# Patient Record
Sex: MALE | Race: BLACK OR AFRICAN AMERICAN | NOT HISPANIC OR LATINO | Employment: FULL TIME | URBAN - METROPOLITAN AREA
[De-identification: names, ages, dates, MRNs, and addresses within clinical notes are randomized per-mention and may not be internally consistent; named-entity substitution may affect disease eponyms.]

---

## 2021-05-07 ENCOUNTER — CLINICAL SUPPORT (OUTPATIENT)
Dept: URGENT CARE | Facility: CLINIC | Age: 36
End: 2021-05-07
Payer: COMMERCIAL

## 2021-05-07 DIAGNOSIS — Z11.52 ENCOUNTER FOR SCREENING LABORATORY TESTING FOR COVID-19 VIRUS: Primary | ICD-10-CM

## 2021-05-07 LAB
CTP QC/QA: YES
SARS-COV-2 RDRP RESP QL NAA+PROBE: NEGATIVE

## 2021-05-07 PROCEDURE — U0002: ICD-10-PCS | Mod: QW,S$GLB,, | Performed by: PHYSICIAN ASSISTANT

## 2021-05-07 PROCEDURE — U0002 COVID-19 LAB TEST NON-CDC: HCPCS | Mod: QW,S$GLB,, | Performed by: PHYSICIAN ASSISTANT

## 2021-08-12 ENCOUNTER — TELEPHONE (OUTPATIENT)
Dept: URGENT CARE | Facility: CLINIC | Age: 36
End: 2021-08-12

## 2021-08-12 ENCOUNTER — CLINICAL SUPPORT (OUTPATIENT)
Dept: URGENT CARE | Facility: CLINIC | Age: 36
End: 2021-08-12
Payer: COMMERCIAL

## 2021-08-12 VITALS — TEMPERATURE: 99 F

## 2021-08-12 DIAGNOSIS — Z11.52 ENCOUNTER FOR SCREENING LABORATORY TESTING FOR COVID-19 VIRUS: Primary | ICD-10-CM

## 2021-08-12 DIAGNOSIS — U07.1 COVID-19: Primary | ICD-10-CM

## 2021-08-12 LAB
CTP QC/QA: YES
SARS-COV-2 RDRP RESP QL NAA+PROBE: POSITIVE

## 2021-08-12 PROCEDURE — U0002 COVID-19 LAB TEST NON-CDC: HCPCS | Mod: QW,S$GLB,, | Performed by: PHYSICIAN ASSISTANT

## 2021-08-12 PROCEDURE — U0002: ICD-10-PCS | Mod: QW,S$GLB,, | Performed by: PHYSICIAN ASSISTANT

## 2021-08-13 ENCOUNTER — INFUSION (OUTPATIENT)
Dept: INFECTIOUS DISEASES | Facility: HOSPITAL | Age: 36
End: 2021-08-13
Attending: INTERNAL MEDICINE
Payer: COMMERCIAL

## 2021-08-13 VITALS
HEIGHT: 75 IN | BODY MASS INDEX: 28.6 KG/M2 | OXYGEN SATURATION: 96 % | DIASTOLIC BLOOD PRESSURE: 70 MMHG | TEMPERATURE: 100 F | RESPIRATION RATE: 16 BRPM | HEART RATE: 102 BPM | SYSTOLIC BLOOD PRESSURE: 109 MMHG | WEIGHT: 230 LBS

## 2021-08-13 DIAGNOSIS — U07.1 COVID-19: Primary | ICD-10-CM

## 2021-08-13 PROCEDURE — M0243 CASIRIVI AND IMDEVI INFUSION: HCPCS | Performed by: INTERNAL MEDICINE

## 2021-08-13 PROCEDURE — 63600175 PHARM REV CODE 636 W HCPCS: Performed by: INTERNAL MEDICINE

## 2021-08-13 PROCEDURE — 25000003 PHARM REV CODE 250: Performed by: INTERNAL MEDICINE

## 2021-08-13 RX ORDER — DIPHENHYDRAMINE HYDROCHLORIDE 50 MG/ML
25 INJECTION INTRAMUSCULAR; INTRAVENOUS ONCE AS NEEDED
Status: ACTIVE | OUTPATIENT
Start: 2021-08-13 | End: 2033-01-09

## 2021-08-13 RX ORDER — ACETAMINOPHEN 325 MG/1
650 TABLET ORAL ONCE AS NEEDED
Status: ACTIVE | OUTPATIENT
Start: 2021-08-13 | End: 2033-01-09

## 2021-08-13 RX ORDER — EPINEPHRINE 0.3 MG/.3ML
0.3 INJECTION SUBCUTANEOUS
Status: ACTIVE | OUTPATIENT
Start: 2021-08-13

## 2021-08-13 RX ORDER — ONDANSETRON 4 MG/1
4 TABLET, ORALLY DISINTEGRATING ORAL ONCE AS NEEDED
Status: ACTIVE | OUTPATIENT
Start: 2021-08-13 | End: 2033-01-09

## 2021-08-13 RX ORDER — ALBUTEROL SULFATE 90 UG/1
2 AEROSOL, METERED RESPIRATORY (INHALATION)
Status: ACTIVE | OUTPATIENT
Start: 2021-08-13

## 2021-08-13 RX ORDER — SODIUM CHLORIDE 0.9 % (FLUSH) 0.9 %
10 SYRINGE (ML) INJECTION
Status: ACTIVE | OUTPATIENT
Start: 2021-08-13

## 2021-08-13 RX ADMIN — CASIRIVIMAB AND IMDEVIMAB 600 MG: 600; 600 INJECTION, SOLUTION, CONCENTRATE INTRAVENOUS at 01:08

## 2025-02-24 ENCOUNTER — HOSPITAL ENCOUNTER (EMERGENCY)
Facility: OTHER | Age: 40
Discharge: HOME | End: 2025-02-24
Attending: EMERGENCY MEDICINE
Payer: COMMERCIAL

## 2025-02-24 VITALS
RESPIRATION RATE: 18 BRPM | SYSTOLIC BLOOD PRESSURE: 141 MMHG | TEMPERATURE: 98 F | BODY MASS INDEX: 28.72 KG/M2 | OXYGEN SATURATION: 98 % | WEIGHT: 231 LBS | HEART RATE: 75 BPM | HEIGHT: 75 IN | DIASTOLIC BLOOD PRESSURE: 95 MMHG

## 2025-02-24 DIAGNOSIS — M25.562 CHRONIC PAIN OF BOTH KNEES: ICD-10-CM

## 2025-02-24 DIAGNOSIS — M25.561 CHRONIC PAIN OF BOTH KNEES: ICD-10-CM

## 2025-02-24 DIAGNOSIS — M54.9 ACUTE LEFT-SIDED BACK PAIN, UNSPECIFIED BACK LOCATION: Primary | ICD-10-CM

## 2025-02-24 DIAGNOSIS — G89.29 CHRONIC PAIN OF BOTH KNEES: ICD-10-CM

## 2025-02-24 PROCEDURE — 99284 EMERGENCY DEPT VISIT MOD MDM: CPT | Mod: 25

## 2025-02-24 PROCEDURE — 63600175 PHARM REV CODE 636 W HCPCS: Mod: JZ,TB

## 2025-02-24 PROCEDURE — 96372 THER/PROPH/DIAG INJ SC/IM: CPT

## 2025-02-24 RX ORDER — NAPROXEN 500 MG/1
500 TABLET ORAL 2 TIMES DAILY WITH MEALS
Qty: 14 TABLET | Refills: 0 | Status: SHIPPED | OUTPATIENT
Start: 2025-02-24 | End: 2025-02-24

## 2025-02-24 RX ORDER — DICLOFENAC SODIUM 10 MG/G
2 GEL TOPICAL 4 TIMES DAILY
Qty: 450 G | Refills: 0 | Status: SHIPPED | OUTPATIENT
Start: 2025-02-24 | End: 2025-02-24

## 2025-02-24 RX ORDER — KETOROLAC TROMETHAMINE 30 MG/ML
15 INJECTION, SOLUTION INTRAMUSCULAR; INTRAVENOUS
Status: COMPLETED | OUTPATIENT
Start: 2025-02-24 | End: 2025-02-24

## 2025-02-24 RX ORDER — NAPROXEN 500 MG/1
500 TABLET ORAL 2 TIMES DAILY WITH MEALS
Qty: 14 TABLET | Refills: 0 | Status: SHIPPED | OUTPATIENT
Start: 2025-02-24 | End: 2025-03-03

## 2025-02-24 RX ORDER — CYCLOBENZAPRINE HCL 10 MG
10 TABLET ORAL 3 TIMES DAILY PRN
Qty: 15 TABLET | Refills: 0 | Status: SHIPPED | OUTPATIENT
Start: 2025-02-24 | End: 2025-03-01

## 2025-02-24 RX ORDER — DICLOFENAC SODIUM 10 MG/G
2 GEL TOPICAL 4 TIMES DAILY
Qty: 450 G | Refills: 0 | Status: SHIPPED | OUTPATIENT
Start: 2025-02-24

## 2025-02-24 RX ORDER — CYCLOBENZAPRINE HCL 10 MG
10 TABLET ORAL 3 TIMES DAILY PRN
Qty: 15 TABLET | Refills: 0 | Status: SHIPPED | OUTPATIENT
Start: 2025-02-24 | End: 2025-02-24

## 2025-02-24 RX ADMIN — KETOROLAC TROMETHAMINE 15 MG: 30 INJECTION, SOLUTION INTRAMUSCULAR at 11:02

## 2025-02-24 NOTE — ED PROVIDER NOTES
Encounter Date: 2/24/2025       History     Chief Complaint   Patient presents with    Back Pain     Reports lower back pain x 1 week. Reports he feels like its due to heavy lifting at work.    Knee Pain     Reports chronic R knee pain. Worsens with movement. Denies injury.      A 39-year-old male with no significant past medical history comes in for left lower back pain that started a week ago and bilateral knee pain for the past year.  His back pain started after doing heavy lifting during the super bowl.  He was seen by [facility] 4 days ago and was given IM ketorolac and was prescribed Flexeril and lidocaine patches.  Patient states that he felt better the 1st day but the pain came back. He returned to the clinic this morning for a follow up and was given an order for a routine spinal/lumbar x-ray, without any instructions on which location to go to. Patient decided to come here to get it done instead. Denies any numbness or tingling.  No radiating pain down his legs.  No urinary or bowel incontinence.  No fevers, shortness of breath,chest pain, or rash.  Patient states he has been dealing with knee pain for a over a year and has not seen a PT for it. The pain gets worst after a long day of walking. Denies any trauma to his knees.        Review of patient's allergies indicates:   Allergen Reactions    Tramadol Other (See Comments)     No past medical history on file.  No past surgical history on file.  No family history on file.  Social History[1]  Review of Systems   Constitutional:  Negative for fever.   Respiratory:  Negative for shortness of breath.    Cardiovascular:  Negative for chest pain.   Gastrointestinal:  Negative for nausea.   Genitourinary:  Positive for flank pain. Negative for dysuria.   Musculoskeletal:  Positive for back pain. Negative for gait problem, joint swelling, myalgias, neck pain and neck stiffness.   Skin:  Negative for rash.   Neurological:  Negative for  dizziness, weakness and numbness.   Hematological:  Does not bruise/bleed easily.       Physical Exam     Initial Vitals [02/24/25 1017]   BP Pulse Resp Temp SpO2   (!) 158/99 73 18 98.4 °F (36.9 °C) 100 %      MAP       --         Physical Exam    Constitutional: He appears well-developed and well-nourished.   HENT:   Head: Normocephalic and atraumatic.   Eyes: EOM are normal. Pupils are equal, round, and reactive to light.   Musculoskeletal:         General: Tenderness present.      Lumbar back: Tenderness present. No swelling, edema, deformity, signs of trauma, lacerations, spasms or bony tenderness. Normal range of motion. Positive right straight leg raise test. Negative left straight leg raise test. No scoliosis.      Right knee: Normal.      Left knee: Normal.      Comments: TTP to left lower back.  No swelling, rash, laceration, or hematoma.  Positive R SLR. 5/5 strength.  Dorsal pulse intact.  No numbness or tingling.     Bilateral knee:  No swelling/rash/laceration/hematoma.  5/5 strength.  Reflexes intact.  Full range of movement.      Neurological: He is alert and oriented to person, place, and time. He has normal strength. He displays normal reflexes. No sensory deficit. GCS score is 15. GCS eye subscore is 4. GCS verbal subscore is 5. GCS motor subscore is 6.   Skin: Skin is warm. Capillary refill takes less than 2 seconds.         ED Course   Procedures  Labs Reviewed - No data to display       Imaging Results              X-Ray Lumbar Spine Ap And Lateral (Final result)  Result time 02/24/25 13:32:10      Final result by Elsa Giang MD (02/24/25 13:32:10)                   Impression:      No acute osseous abnormality seen.      Electronically signed by: Elsa Giang  Date:    02/24/2025  Time:    13:32               Narrative:    EXAMINATION:  XR LUMBAR SPINE AP AND LATERAL    CLINICAL HISTORY:  low back pain;    TECHNIQUE:  AP, lateral and spot images were performed of the lumbar  spine.    COMPARISON:  None    FINDINGS:  Five lumbar vertebral bodies.  Vertebral body heights are maintained.    Disc spaces are well maintained.    AP alignment appears anatomic.                                       Medications   ketorolac injection 15 mg (15 mg Intramuscular Given 2/24/25 1104)     Medical Decision Making  A 39-year-old male comes in with chronic knee pain and an acute lower left back pain x1wk.  States that he was lifting heavy stuff during work.  No recent trauma or past surgeries to his back.  Positive L SLR test.  No numbness or tingling in the buttocks, hips, inner thighs, and backs of the legs. No urinary or bowel incontinence. No radiating pain down bilateral legs. No midline tenderness. Bilateral knees are not TTP. No swelling, edema, erythematous, or hot to touch of bilateral knees.     Ddx for back pain:  Cauda equina, lower back muscle strain, lumbar fracture/dislocation, lumbar abscess, arthritis  Ddx for knee pain:  sepsis arthritis, arthritis, ligament injury, tendinitis, patellofemoral pain syndrome     X-ray shows no disc fractures, dislocation, or hernia of his lower back. The left lower back pain is probably a muscle strain. Referral to PT has been placed to help patient with his back and knee pain. The patient verbalizes understanding and agrees with the plan and course of treatment. Return to ED precautions has been discussed. I discussed the care of this patient with my supervising physician.       Amount and/or Complexity of Data Reviewed  Radiology: ordered.    Risk  Prescription drug management.              Attending Attestation:     Physician Attestation Statement for NP/PA:   I personally made/approved the management plan and take responsibility for the patient management.    Other NP/PA Attestation Additions:    History of Present Illness: Back pain evaluated in OP setting pending xr     Medical Decision Making: Low back pain is a profoundly broad differential  including benign back strains and spasms to serious structural issues such as cauda equina syndrome, prolapsed disc, epidural abscess or pathologic fractures even to back pain mimics like pyelonephritis, AAA or ischemic bowel.    The differential was considered and the appropriate diagnostics were ordered therefore with a disposition determination in line with the most likely underlying cause.                                        Clinical Impression:  Final diagnoses:  [M54.9] Acute left-sided back pain, unspecified back location (Primary)  [M25.561, M25.562, G89.29] Chronic pain of both knees          ED Disposition Condition    Discharge Stable          ED Prescriptions       Medication Sig Dispense Start Date End Date Auth. Provider    naproxen (NAPROSYN) 500 MG tablet  (Status: Discontinued) Take 1 tablet (500 mg total) by mouth 2 (two) times daily with meals. for 7 days 14 tablet 2/24/2025 2/24/2025 Trevor Wong PA-C    diclofenac sodium (VOLTAREN ARTHRITIS PAIN) 1 % Gel  (Status: Discontinued) Apply 2 g topically 4 (four) times daily. 450 g 2/24/2025 2/24/2025 Trevor Wong PA-C    cyclobenzaprine (FLEXERIL) 10 MG tablet  (Status: Discontinued) Take 1 tablet (10 mg total) by mouth 3 (three) times daily as needed for Muscle spasms. 15 tablet 2/24/2025 2/24/2025 Trevor Wong PA-C    diclofenac sodium (VOLTAREN ARTHRITIS PAIN) 1 % Gel  (Status: Discontinued) Apply 2 g topically 4 (four) times daily. 450 g 2/24/2025 2/24/2025 Trevor Wong PA-C    naproxen (NAPROSYN) 500 MG tablet  (Status: Discontinued) Take 1 tablet (500 mg total) by mouth 2 (two) times daily with meals. for 7 days 14 tablet 2/24/2025 2/24/2025 Trevor Wong PA-C    cyclobenzaprine (FLEXERIL) 10 MG tablet Take 1 tablet (10 mg total) by mouth 3 (three) times daily as needed for Muscle spasms. 15 tablet 2/24/2025 3/1/2025 Trevor Wong LAYLA    diclofenac sodium (VOLTAREN ARTHRITIS PAIN) 1 % Gel Apply 2 g  topically 4 (four) times daily. 450 g 2/24/2025 -- Trevor Wong PA-C    naproxen (NAPROSYN) 500 MG tablet Take 1 tablet (500 mg total) by mouth 2 (two) times daily with meals. for 7 days 14 tablet 2/24/2025 3/3/2025 Trevor Wong PA-C          Follow-up Information       Follow up With Specialties Details Why Contact Info    [facility] - Emergency Dept Emergency Medicine Today If symptoms worsen [id number] [facility] [id number]  [phone]               Trevor Wong PA-C  02/24/25 5538         [1]         Ji Boland MD  02/24/25 4226

## 2025-02-24 NOTE — Clinical Note
"Rodri Toro" Kai was seen and treated in our emergency department on 2/24/2025.  He may return to work on 02/24/2025.       If you have any questions or concerns, please don't hesitate to call.      Trevor Wong PA-C"

## 2025-02-24 NOTE — Clinical Note
"Rodri Varelal" Kai was seen and treated in our emergency department on 2/24/2025.  He may return to work on 02/25/2025.       If you have any questions or concerns, please don't hesitate to call.      Trevor Wong PA-C"

## 2025-02-24 NOTE — DISCHARGE INSTRUCTIONS
Rodri Quinn    Please return for any worsening, new, changed, or concerning symptoms.        For access to your medical records and a summary of today's visit, please use [facility].  We look forward to serving all your future healthcare needs at [facility].    Warm regards,  Trevor Wong PA-C